# Patient Record
Sex: FEMALE | Race: WHITE | NOT HISPANIC OR LATINO | URBAN - METROPOLITAN AREA
[De-identification: names, ages, dates, MRNs, and addresses within clinical notes are randomized per-mention and may not be internally consistent; named-entity substitution may affect disease eponyms.]

---

## 2019-06-16 ENCOUNTER — EMERGENCY (EMERGENCY)
Facility: HOSPITAL | Age: 74
LOS: 1 days | Discharge: HOME | End: 2019-06-16
Attending: EMERGENCY MEDICINE
Payer: MEDICARE

## 2019-06-16 VITALS
RESPIRATION RATE: 18 BRPM | TEMPERATURE: 98 F | HEART RATE: 92 BPM | SYSTOLIC BLOOD PRESSURE: 180 MMHG | HEIGHT: 66 IN | OXYGEN SATURATION: 99 % | WEIGHT: 173.06 LBS | DIASTOLIC BLOOD PRESSURE: 104 MMHG

## 2019-06-16 PROCEDURE — 11730 AVULSION NAIL PLATE SIMPLE 1: CPT | Mod: F3,XU

## 2019-06-16 PROCEDURE — 99283 EMERGENCY DEPT VISIT LOW MDM: CPT | Mod: 57,GC,25

## 2019-06-16 PROCEDURE — 99282 EMERGENCY DEPT VISIT SF MDM: CPT | Mod: 25

## 2019-06-16 PROCEDURE — 12001 RPR S/N/AX/GEN/TRNK 2.5CM/<: CPT | Mod: 59,GC

## 2019-06-16 PROCEDURE — 11760 REPAIR OF NAIL BED: CPT | Mod: 54,GC

## 2019-06-16 PROCEDURE — 11760 REPAIR OF NAIL BED: CPT | Mod: F3

## 2019-06-16 PROCEDURE — 26750 TREAT FINGER FRACTURE EACH: CPT | Mod: 54

## 2019-06-16 PROCEDURE — 26750 TREAT FINGER FRACTURE EACH: CPT | Mod: F3

## 2019-06-16 PROCEDURE — 12001 RPR S/N/AX/GEN/TRNK 2.5CM/<: CPT | Mod: F3,XU

## 2019-06-16 PROCEDURE — 11730 AVULSION NAIL PLATE SIMPLE 1: CPT | Mod: 59,GC

## 2019-06-16 RX ORDER — ACETAMINOPHEN 500 MG
975 TABLET ORAL ONCE
Refills: 0 | Status: COMPLETED | OUTPATIENT
Start: 2019-06-16 | End: 2019-06-16

## 2019-06-16 RX ADMIN — Medication 975 MILLIGRAM(S): at 15:04

## 2019-06-16 NOTE — ED PROVIDER NOTE - PRINCIPAL DIAGNOSIS
Phalanx, hand fracture, open, initial encounter Open displaced fracture of distal phalanx of right ring finger, initial encounter

## 2019-06-16 NOTE — ED PROCEDURE NOTE - CPROC ED TOLERANCE1
Rapid HIV is negative  Hep C + with RNA of 75. Also folate, iron and B12 deficient. Combination of these causing marrow failure?  Pt followed by Hem/Onc and s/p Granix for neutropenia x 1. Neutropenia improved since. ANC 1600  Platelets low and with active bleeding s/p transfusion 1U of platelets on 2/18  S/p transfusion of 2U PRBC on 2/18  Will transfuse both platelets and PRBCs again today as I believe he is having a GI bleed. Iron with PRBCs. Will add folic acid and B12 supplementation today.   Continue to monitor   Patient tolerated procedure well.

## 2019-06-16 NOTE — ED PROVIDER NOTE - CARE PLAN
Principal Discharge DX:	Phalanx, hand fracture, open, initial encounter  Secondary Diagnosis:	Nailbed laceration, finger, initial encounter Principal Discharge DX:	Open displaced fracture of distal phalanx of right ring finger, initial encounter  Secondary Diagnosis:	Nailbed laceration, finger, initial encounter

## 2019-06-16 NOTE — ED PROVIDER NOTE - ATTENDING CONTRIBUTION TO CARE
Sherri - 73yo F w R 4th digit distal phalanx injury with laceration through nailbed while the car window closed on her fingertip. Sent from [facility] he was given Tdap, 10cc lidocaine and 500mg keflex. No other injuries. Lac on both sides of the nail, distal half of nail fractured but still attached to nailbed. Distal fingertip loose. 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 Likely tuft fx. Prox part of nail intact, attached to nailbed and matrix. Will remove distal nail, repair skin, repair nailbed, DC w Hand f/u. Hand Sx omn call aware

## 2019-06-16 NOTE — ED PROCEDURE NOTE - PROCEDURE ADDITIONAL DETAILS
Procedure 1: Intrathecal right 4th digit block with 10cc of lidocaine administered  Procedure 2: Nail removed  Procedure 3: 1st laceration: 1.5 cm lateral to distal phalanx laceration repaired with 4.0 Nylon 2 simple interrupted and 6 running sutures  2nd laceration 1cm medial to distal phalanx repaired with 4.0 Nylon 6 running sutures  3rd laceration 1cm laceration to nailbed R fourth digit. Repaired with 1 simple interrupted [place] however given how macerated nailbed was 4.0 Nylon 3 simple interrupted placed.  Procedure 4: Finger splint applied.

## 2019-06-16 NOTE — ED PROVIDER NOTE - NS ED ROS FT
CONSTITUTIONAL: No fevers, no chills  Eyes: no visual changes  Ears: no ear drainage, no ear pain  Nose: no nasal congestion  Mouth/Throat: no sore throat  Cardiovascular: No Chest pain  Respiratory: No SOB  Gastrointestinal: No n/v/d, no abd pain  Genitourinary: no dysuria, no hematuria  SKIN: Laceration through finger tip  NEURO: no headache

## 2019-06-16 NOTE — ED PROVIDER NOTE - NSFOLLOWUPINSTRUCTIONS_ED_ALL_ED_FT
1. Return to ED for worsening, progressive or any other concerning symptoms   2. Follow up with Dr Gonzalez call for appointment [phone] for appointment in next few days  3. Let Dr. Gonzalez know all sutures are Nylon and need to be removed by plastic surgery.  4. Continue with your prescribed keflex.  5. Monitor for redness and streaking of blood.  6. Keep splint on finger until seen by Dr. Gonzalez

## 2019-06-16 NOTE — ED PROVIDER NOTE - OBJECTIVE STATEMENT
74 YOF pmh right 4th digit distal phalanx fx with laceration through nailbed. pt seen at urgent care told to come to ed. Pt hit her hand in a car door earlier today. Pt was given Tdap, 10cc lidocaine and 500mg keflex at  prior to being told to come to ed. No fever, no chills, no numbness in finger, no other trauma.

## 2023-12-05 NOTE — ED ADULT NURSE NOTE - NSIMPLEMENTINTERV_GEN_ALL_ED
GCS 15. Patient wheeled to treatment area. Patient has been having chest pain and vomiting for about 2 hours. Patient has Hx of defibrillator/Afib/ablation.
Implemented All Universal Safety Interventions:  [place] to call system. Call bell, personal items and telephone within reach. Instruct patient to call for assistance. Room bathroom lighting operational. Non-slip footwear when patient is off stretcher. Physically safe environment: no spills, clutter or unnecessary equipment. Stretcher in lowest position, wheels locked, appropriate side rails in place.